# Patient Record
Sex: FEMALE | Race: WHITE | NOT HISPANIC OR LATINO | URBAN - METROPOLITAN AREA
[De-identification: names, ages, dates, MRNs, and addresses within clinical notes are randomized per-mention and may not be internally consistent; named-entity substitution may affect disease eponyms.]

---

## 2020-07-25 ENCOUNTER — TELEPHONE ENCOUNTER (OUTPATIENT)
Dept: URBAN - METROPOLITAN AREA CLINIC 13 | Facility: CLINIC | Age: 45
End: 2020-07-25

## 2020-07-26 ENCOUNTER — TELEPHONE ENCOUNTER (OUTPATIENT)
Dept: URBAN - METROPOLITAN AREA CLINIC 13 | Facility: CLINIC | Age: 45
End: 2020-07-26

## 2023-12-21 ENCOUNTER — DASHBOARD ENCOUNTERS (OUTPATIENT)
Age: 48
End: 2023-12-21

## 2023-12-21 ENCOUNTER — OFFICE VISIT (OUTPATIENT)
Dept: URBAN - METROPOLITAN AREA CLINIC 107 | Facility: CLINIC | Age: 48
End: 2023-12-21

## 2024-01-31 ENCOUNTER — OFFICE VISIT (OUTPATIENT)
Dept: URBAN - METROPOLITAN AREA CLINIC 113 | Facility: CLINIC | Age: 49
End: 2024-01-31

## 2024-12-16 ENCOUNTER — OFFICE VISIT (OUTPATIENT)
Dept: URBAN - METROPOLITAN AREA CLINIC 107 | Facility: CLINIC | Age: 49
End: 2024-12-16
Payer: COMMERCIAL

## 2024-12-16 ENCOUNTER — LAB OUTSIDE AN ENCOUNTER (OUTPATIENT)
Dept: URBAN - METROPOLITAN AREA CLINIC 107 | Facility: CLINIC | Age: 49
End: 2024-12-16

## 2024-12-16 VITALS
WEIGHT: 226 LBS | DIASTOLIC BLOOD PRESSURE: 98 MMHG | SYSTOLIC BLOOD PRESSURE: 150 MMHG | TEMPERATURE: 97.5 F | BODY MASS INDEX: 40.04 KG/M2 | HEART RATE: 68 BPM | HEIGHT: 63 IN

## 2024-12-16 DIAGNOSIS — Z12.11 COLON CANCER SCREENING: ICD-10-CM

## 2024-12-16 PROCEDURE — 99203 OFFICE O/P NEW LOW 30 MIN: CPT | Performed by: NURSE PRACTITIONER

## 2024-12-16 NOTE — HPI-TODAY'S VISIT:
49-year-old woman presenting to discuss a screening colonoscopy.  She was deemed in eligible for direct access colonoscopy secondary to a history of an abnormal heart rhythm. Her last colonoscopy in May 2010, performed for diarrhea, abdominal pain and red blood per rectum was notable for a good bowel preparation, normal examined ileum, normal colon status post random biopsies, and anal fissure.  Random colon biopsies are unremarkable.  She has a history of iron deficiency anemia noted on labs from June 2022 where serum iron was 27, iron saturation 8%.  She tells me that she has a history of an irregular heartbeat, managed with Ziac (bisoprolol/HCTZ). She has not followed with Dr. Guillen in years because her irregularities in heart rate/rhythm have been managed with medication. She is without any abdominal complaints. No dysphagia, heartburn, regurgitation, unintentional weight loss, nausea, vomiting, hematemesis or melena. Bowels are moving regularly without blood per rectum. No complaints of bloating. No jaundice, icterus.   She denies any history of lung disease, need for supplemental oxygen. She denies any history of cardiac disease, stent placement, need for blood thinning medications, history of pacemaker or defibrillator. There is no history of chronic kidney disease or need for dialysis. No history of neurologic disease or seizure disorder. She is not taking any medication for diabetes or weight loss, including GLP-1 agonists, SGLT-2 or phentermine.

## 2025-01-14 ENCOUNTER — OFFICE VISIT (OUTPATIENT)
Dept: URBAN - METROPOLITAN AREA SURGERY CENTER 25 | Facility: SURGERY CENTER | Age: 50
End: 2025-01-14

## 2025-02-17 ENCOUNTER — OFFICE VISIT (OUTPATIENT)
Dept: URBAN - METROPOLITAN AREA CLINIC 107 | Facility: CLINIC | Age: 50
End: 2025-02-17